# Patient Record
Sex: FEMALE | URBAN - METROPOLITAN AREA
[De-identification: names, ages, dates, MRNs, and addresses within clinical notes are randomized per-mention and may not be internally consistent; named-entity substitution may affect disease eponyms.]

---

## 2024-10-11 ENCOUNTER — OFFICE VISIT (OUTPATIENT)
Age: 59
End: 2024-10-11

## 2024-10-11 VITALS
DIASTOLIC BLOOD PRESSURE: 81 MMHG | TEMPERATURE: 98 F | SYSTOLIC BLOOD PRESSURE: 138 MMHG | WEIGHT: 140 LBS | BODY MASS INDEX: 25.76 KG/M2 | HEART RATE: 87 BPM | HEIGHT: 62 IN | OXYGEN SATURATION: 95 %

## 2024-10-11 DIAGNOSIS — E01.0 THYROMEGALY: ICD-10-CM

## 2024-10-11 DIAGNOSIS — E03.4 HYPOTHYROIDISM DUE TO ACQUIRED ATROPHY OF THYROID: Primary | ICD-10-CM

## 2024-10-11 RX ORDER — LEVOTHYROXINE SODIUM 175 MCG
TABLET ORAL
Qty: 90 TABLET | Refills: 2 | Status: SHIPPED | OUTPATIENT
Start: 2024-10-11

## 2024-10-11 RX ORDER — LEVOTHYROXINE SODIUM 175 MCG
TABLET ORAL
Qty: 90 TABLET | Refills: 3
Start: 2024-10-11

## 2024-10-11 RX ORDER — LEVOTHYROXINE SODIUM 175 UG/1
175 TABLET ORAL DAILY
COMMUNITY
End: 2024-10-11

## 2024-10-11 NOTE — PATIENT INSTRUCTIONS
SPECIFIC INSTRUCTIONS BELOW       Synthroid 175 mcg  A day,  BRAND   on empty stomach with water only, no other meds or food or drinks   For next half hour     Take any kind of vitamins, calcium, iron   Pills  4 hours later        -------------PAY ATTENTION TO THESE GENERAL INSTRUCTIONS -----------------      - The medications prescribed at this visit will not be available at pharmacy until 6 pm today        - your med list is not updated until the visit encounter is closed, so FOLLOW THE TYPED SPECIFIC INSTRUCTIONS  ABOVE     -ANY tests other than blood work, which you opt to do  outside the  [facility] facilities, you are responsible for prior authorizations if  required    - health maintenance will not be updated  on AVS, so please ignore it       Results     *Normal results will not be notified by a phone call starting January 1 2024   *If you have an upcoming visit, the results will be discussed at the visit   *Please sign up for MY CHART if you want access to your lab and test results  *Abnormal results which require immediate attention will be notified by phone call   *Abnormal results which do not require immediate assistance will be notified in 1-2 weeks       Refills    -    have your pharmacy send us a refill request . Refills are done max for one year and a visit is a must before refills are extended    Follow up appointments -  highly encourage you to make it when you are checking out. We can accommodate you into the schedule based on your clinical situation, but not for extending refills beyond a year. Labs are important to give refills and it is important to get labs before the visit     Phone calls  -  Allow  24 hrs. for non-urgent calls to be returned  Prior authorization - It may take 2 to 4 weeks to process  Forms  -  FMLA, DMV etc., will take up to 2 weeks to process  Cancellations - please notify the office 2 days in advance   Samples  - will only be dispensed at visits       If not

## 2024-10-11 NOTE — PROGRESS NOTES
[facility] DIABETES AND ENDOCRINOLOGY              Soila Miles MD FACE        PCP: Chelsey Brooks MD     Referring Physician:   pt  requested     Melony Ley is a 59 y.o. female patient.    Thyroid Problem         Initial visit  for   hypothyroidism  for 10 years   Always healthy  patient  ,  not very significant medical history otherwise     Pt was alternating  175 mcg  and 200 mcg  alternate days   She felt palpitations , headache on and off     and   since  she started taking 175 mcg   daily   for the past   6 months,  she has not felt   any  symptoms  any more     She strongly  expresses that she likes to be on 175 mcg a day  instead of  200 mcg dose   Even when levels showed to be low supplementation of  thyroid hormone,  she  says she feels better on lower doses         History reviewed. No pertinent past medical history.    Social History     Socioeconomic History    Marital status: Unknown     Spouse name: Not on file    Number of children: Not on file    Years of education: Not on file    Highest education level: Not on file   Occupational History    Not on file   Tobacco Use    Smoking status: Every Day     Current packs/day: 0.50     Types: Cigarettes    Smokeless tobacco: Current   Substance and Sexual Activity    Alcohol use: Yes    Drug use: Never    Sexual activity: Not on file   Other Topics Concern    Not on file   Social History Narrative    Not on file     Social Determinants of Health     Financial Resource Strain: Not on file   Food Insecurity: Not on file   Transportation Needs: Not on file   Physical Activity: Not on file   Stress: Not on file   Social Connections: Not on file   Intimate Partner Violence: Not on file   Housing Stability: Not on file       Family History   Problem Relation Age of Onset    Diabetes Mother     Hypertension Mother     Cancer Father     No Known Problems Sister            Current Outpatient Medications   Medication Sig Dispense Refill    levothyroxine

## 2024-10-12 LAB
T4 FREE SERPL-MCNC: 1.8 NG/DL (ref 0.8–1.5)
TSH SERPL DL<=0.05 MIU/L-ACNC: 0.73 UIU/ML (ref 0.36–3.74)

## 2024-10-14 ENCOUNTER — TELEPHONE (OUTPATIENT)
Age: 59
End: 2024-10-14

## 2024-10-14 NOTE — TELEPHONE ENCOUNTER
Attempted to call. Unsuccessful. Left msg for Melony Ley to give us a call back at the office. A callback number was left.

## 2024-10-14 NOTE — TELEPHONE ENCOUNTER
----- Message from Dr. Soila Miles MD sent at 10/13/2024 10:43 PM EDT -----  Thyroid labs are good   So, she can stay on 175 mcg Brand Synthroid and if she is ok, will send the script to [facility] pharmacy    Soila Miles MD

## 2024-10-14 NOTE — TELEPHONE ENCOUNTER
----- Message from Dr. Soila Miles MD sent at 10/13/2024 10:43 PM EDT -----  Thyroid labs are good   So, she can stay on 175 mcg Brand Synthroid and if she is ok, will send the script to [facility] pharmacy    Soila Miles MD